# Patient Record
Sex: MALE | Race: BLACK OR AFRICAN AMERICAN | NOT HISPANIC OR LATINO | URBAN - METROPOLITAN AREA
[De-identification: names, ages, dates, MRNs, and addresses within clinical notes are randomized per-mention and may not be internally consistent; named-entity substitution may affect disease eponyms.]

---

## 2018-11-29 ENCOUNTER — EMERGENCY (EMERGENCY)
Facility: HOSPITAL | Age: 28
LOS: 0 days | Discharge: HOME | End: 2018-11-30
Attending: EMERGENCY MEDICINE
Payer: MEDICAID

## 2018-11-29 VITALS
WEIGHT: 190.04 LBS | SYSTOLIC BLOOD PRESSURE: 129 MMHG | RESPIRATION RATE: 17 BRPM | TEMPERATURE: 98 F | HEART RATE: 81 BPM | HEIGHT: 70 IN | DIASTOLIC BLOOD PRESSURE: 72 MMHG | OXYGEN SATURATION: 98 %

## 2018-11-29 PROCEDURE — 93010 ELECTROCARDIOGRAM REPORT: CPT

## 2018-11-29 PROCEDURE — 99284 EMERGENCY DEPT VISIT MOD MDM: CPT

## 2018-11-29 NOTE — ED ADULT NURSE NOTE - OBJECTIVE STATEMENT
Pt states, "I was having some chest pain. I think its anxiety.  I found out my children aren't mine." Pt states, "I was having some chest pain. I think its anxiety.  I found out my children aren't mine."  Pt c/o chest pain since Wednesday.

## 2018-11-29 NOTE — ED ADULT NURSE NOTE - NSIMPLEMENTINTERV_GEN_ALL_ED
Implemented All Universal Safety Interventions:  [place] to call system. Call bell, personal items and telephone within reach. Instruct patient to call for assistance. Room bathroom lighting operational. Non-slip footwear when patient is off stretcher. Physically safe environment: no spills, clutter or unnecessary equipment. Stretcher in lowest position, wheels locked, appropriate side rails in place.

## 2018-11-29 NOTE — ED ADULT NURSE NOTE - NS ED NURSE RECORD ANOTHER VITAL SIGN
Patient Outreach Department    Patient was seen recently at an [facility].  An attempt was made to contact the patient's Mother to see how he is, verify his PCP, or if he would be interested in establishing care with an [facility] primary care provider.    Spoke to Pt's Mother and stated pt was doing really good, still a little cough and taking inhaler. PCP retired and currently looking. Provided web site information.  
Yes

## 2018-11-30 VITALS
OXYGEN SATURATION: 98 % | HEART RATE: 53 BPM | TEMPERATURE: 99 F | SYSTOLIC BLOOD PRESSURE: 118 MMHG | RESPIRATION RATE: 17 BRPM | DIASTOLIC BLOOD PRESSURE: 70 MMHG

## 2018-11-30 DIAGNOSIS — R07.89 OTHER CHEST PAIN: ICD-10-CM

## 2018-11-30 DIAGNOSIS — Z91.013 ALLERGY TO SEAFOOD: ICD-10-CM

## 2018-11-30 DIAGNOSIS — Z79.52 LONG TERM (CURRENT) USE OF SYSTEMIC STEROIDS: ICD-10-CM

## 2018-11-30 LAB
ALBUMIN SERPL ELPH-MCNC: 3.5 G/DL — SIGNIFICANT CHANGE UP (ref 3.3–5)
ALP SERPL-CCNC: 84 U/L — SIGNIFICANT CHANGE UP (ref 40–120)
ALT FLD-CCNC: 59 U/L — SIGNIFICANT CHANGE UP (ref 12–78)
ANION GAP SERPL CALC-SCNC: 10 MMOL/L — SIGNIFICANT CHANGE UP (ref 5–17)
APTT BLD: 25.3 SEC — LOW (ref 27.5–36.3)
AST SERPL-CCNC: 45 U/L — HIGH (ref 15–37)
BILIRUB SERPL-MCNC: 0.5 MG/DL — SIGNIFICANT CHANGE UP (ref 0.2–1.2)
BUN SERPL-MCNC: 16 MG/DL — SIGNIFICANT CHANGE UP (ref 7–23)
CALCIUM SERPL-MCNC: 8.2 MG/DL — LOW (ref 8.5–10.1)
CHLORIDE SERPL-SCNC: 107 MMOL/L — SIGNIFICANT CHANGE UP (ref 96–108)
CK MB BLD-MCNC: 0.4 % — SIGNIFICANT CHANGE UP (ref 0–3.5)
CK MB CFR SERPL CALC: 1.8 NG/ML — SIGNIFICANT CHANGE UP (ref 0.5–3.6)
CK SERPL-CCNC: 410 U/L — HIGH (ref 26–308)
CO2 SERPL-SCNC: 25 MMOL/L — SIGNIFICANT CHANGE UP (ref 22–31)
CREAT SERPL-MCNC: 1.23 MG/DL — SIGNIFICANT CHANGE UP (ref 0.5–1.3)
D DIMER BLD IA.RAPID-MCNC: 311 NG/ML DDU — HIGH
GLUCOSE SERPL-MCNC: 86 MG/DL — SIGNIFICANT CHANGE UP (ref 70–99)
HCT VFR BLD CALC: 39.2 % — SIGNIFICANT CHANGE UP (ref 39–50)
HGB BLD-MCNC: 13.1 G/DL — SIGNIFICANT CHANGE UP (ref 13–17)
INR BLD: 0.94 RATIO — SIGNIFICANT CHANGE UP (ref 0.88–1.16)
MCHC RBC-ENTMCNC: 28.5 PG — SIGNIFICANT CHANGE UP (ref 27–34)
MCHC RBC-ENTMCNC: 33.4 GM/DL — SIGNIFICANT CHANGE UP (ref 32–36)
MCV RBC AUTO: 85.4 FL — SIGNIFICANT CHANGE UP (ref 80–100)
NRBC # BLD: 0 /100 WBCS — SIGNIFICANT CHANGE UP (ref 0–0)
PLATELET # BLD AUTO: 290 K/UL — SIGNIFICANT CHANGE UP (ref 150–400)
POTASSIUM SERPL-MCNC: 3.7 MMOL/L — SIGNIFICANT CHANGE UP (ref 3.5–5.3)
POTASSIUM SERPL-SCNC: 3.7 MMOL/L — SIGNIFICANT CHANGE UP (ref 3.5–5.3)
PROT SERPL-MCNC: 7.2 GM/DL — SIGNIFICANT CHANGE UP (ref 6–8.3)
PROTHROM AB SERPL-ACNC: 10.5 SEC — SIGNIFICANT CHANGE UP (ref 10–12.9)
RBC # BLD: 4.59 M/UL — SIGNIFICANT CHANGE UP (ref 4.2–5.8)
RBC # FLD: 15.2 % — HIGH (ref 10.3–14.5)
SODIUM SERPL-SCNC: 142 MMOL/L — SIGNIFICANT CHANGE UP (ref 135–145)
TROPONIN I SERPL-MCNC: <.015 NG/ML — SIGNIFICANT CHANGE UP (ref 0.01–0.04)
TROPONIN I SERPL-MCNC: <.015 NG/ML — SIGNIFICANT CHANGE UP (ref 0.01–0.04)
WBC # BLD: 4.75 K/UL — SIGNIFICANT CHANGE UP (ref 3.8–10.5)
WBC # FLD AUTO: 4.75 K/UL — SIGNIFICANT CHANGE UP (ref 3.8–10.5)

## 2018-11-30 PROCEDURE — 71275 CT ANGIOGRAPHY CHEST: CPT | Mod: 26

## 2018-11-30 PROCEDURE — 71046 X-RAY EXAM CHEST 2 VIEWS: CPT | Mod: 26

## 2018-11-30 RX ORDER — ASPIRIN/CALCIUM CARB/MAGNESIUM 324 MG
325 TABLET ORAL ONCE
Qty: 0 | Refills: 0 | Status: COMPLETED | OUTPATIENT
Start: 2018-11-30 | End: 2018-11-30

## 2018-11-30 RX ADMIN — Medication 325 MILLIGRAM(S): at 05:31

## 2018-11-30 NOTE — ED PROVIDER NOTE - MEDICAL DECISION MAKING DETAILS
labs and imaging reviewed. patient received aspirin. he has been in good condition throughout ed course. patient now being discharged with care instructions. he will follow up with pmd. Discussed results and outcome of testing with the patient.  Patient advised to please follow up with their primary care doctor within the next 24 hours and return to the Emergency Department for worsening symptoms or any other concerns.  Patient advised that their doctor may call  to follow up on the specific results of the tests performed today in the emergency department.

## 2018-11-30 NOTE — ED PROVIDER NOTE - CARE PROVIDER_API CALL
Serafin Faith), Internal Medicine  [address] [place] [address]  Phone: [phone]  Fax: [phone]

## 2018-11-30 NOTE — ED PROVIDER NOTE - OBJECTIVE STATEMENT
Pertinent PMH/PSH/FHx/SHx and Review of Systems contained within:  27 yo m with no pmh presents in ED c/o chest pain for the last 3 days. No aggravating or relieving factors, No fever/chills, No photophobia/eye pain/changes in vision, No ear pain/sore throat/dysphagia, No palpitations, no SOB/cough/wheeze/stridor, No abdominal pain, No N/V/D, no dysuria/frequency/discharge, No neck/back pain, no rash, no changes in neurological status/function.

## 2018-12-01 ENCOUNTER — OUTPATIENT (OUTPATIENT)
Dept: OUTPATIENT SERVICES | Facility: HOSPITAL | Age: 28
LOS: 1 days | End: 2018-12-01
Payer: MEDICAID

## 2018-12-01 PROCEDURE — G9001: CPT

## 2018-12-05 DIAGNOSIS — Z71.89 OTHER SPECIFIED COUNSELING: ICD-10-CM

## 2019-08-07 ENCOUNTER — EMERGENCY (EMERGENCY)
Facility: HOSPITAL | Age: 29
LOS: 0 days | Discharge: HOME | End: 2019-08-07
Payer: COMMERCIAL

## 2019-08-07 VITALS
HEART RATE: 76 BPM | SYSTOLIC BLOOD PRESSURE: 136 MMHG | TEMPERATURE: 98 F | OXYGEN SATURATION: 98 % | DIASTOLIC BLOOD PRESSURE: 78 MMHG | RESPIRATION RATE: 17 BRPM

## 2019-08-07 VITALS
HEART RATE: 63 BPM | OXYGEN SATURATION: 100 % | DIASTOLIC BLOOD PRESSURE: 85 MMHG | HEIGHT: 71 IN | RESPIRATION RATE: 20 BRPM | TEMPERATURE: 98 F | WEIGHT: 188.94 LBS | SYSTOLIC BLOOD PRESSURE: 139 MMHG

## 2019-08-07 DIAGNOSIS — M54.2 CERVICALGIA: ICD-10-CM
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
## 2019-08-07 DIAGNOSIS — Z91.013 ALLERGY TO SEAFOOD: ICD-10-CM

## 2019-08-07 DIAGNOSIS — T14.90XA INJURY, UNSPECIFIED, INITIAL ENCOUNTER: ICD-10-CM

## 2019-08-07 DIAGNOSIS — Z88.8 ALLERGY STATUS TO OTHER DRUGS, MEDICAMENTS AND BIOLOGICAL SUBSTANCES STATUS: ICD-10-CM
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
## 2019-08-07 DIAGNOSIS — M54.5 LOW BACK PAIN: ICD-10-CM

## 2019-08-07 DIAGNOSIS — Z04.1 ENCOUNTER FOR EXAMINATION AND OBSERVATION FOLLOWING TRANSPORT ACCIDENT: ICD-10-CM

## 2019-08-07 PROCEDURE — 99283 EMERGENCY DEPT VISIT LOW MDM: CPT

## 2019-08-07 RX ORDER — IBUPROFEN 200 MG
1 TABLET ORAL
Qty: 20 | Refills: 0
Start: 2019-08-07

## 2019-08-07 RX ORDER — CYCLOBENZAPRINE HYDROCHLORIDE 10 MG/1
1 TABLET, FILM COATED ORAL
Qty: 10 | Refills: 0
Start: 2019-08-07

## 2019-08-07 RX ORDER — IBUPROFEN 200 MG
600 TABLET ORAL ONCE
Refills: 0 | Status: COMPLETED | OUTPATIENT
Start: 2019-08-07 | End: 2019-08-07

## 2019-08-07 RX ADMIN — Medication 600 MILLIGRAM(S): at 16:40

## 2019-08-07 RX ADMIN — Medication 600 MILLIGRAM(S): at 16:41
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
## 2019-08-07 NOTE — ED PROVIDER NOTE - PHYSICAL EXAMINATION
Gen: Alert, NAD, well appearing  Head: NC, AT, PERRL, EOMI, normal lids/conjunctiva  ENT: B TM WNL, normal hearing, patent oropharynx without erythema/exudate, uvula midline  Neck: +supple, no spinal tenderness/meningismus/JVD, +Trachea midline +R trapezius tenderness  Pulm: Bilateral BS, normal resp effort, no wheeze/stridor/retractions  CV: RRR, no M/R/G, +dist pulses  Abd: soft, NT/ND, +BS, no hepatosplenomegaly  Mskel: no edema/erythema/cyanosis no saddle anesthesia, +R lumbar paraspinal tenderness, no step off, no ecchymosis, no spinal tenderness ctls, neg slr, str 5/5 x4 bl, sensations intact, gait ok  Skin: no rash  Neuro: AAOx3, no sensory/motor deficits, CN 2-12 intact

## 2019-08-07 NOTE — ED PROVIDER NOTE - CLINICAL SUMMARY MEDICAL DECISION MAKING FREE TEXT BOX
nexus neg, no red flags, neuro exam unremarkable, pt is ambulatory in ed without compliactions, vss, afebrile, pt states feels better after meds pt driving home will send muscle relaxer to pharmacy, pt has pcp he will fu with, provided ortho spine fu, return precautions given, ok with dc
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
## 2019-09-17 ENCOUNTER — EMERGENCY (EMERGENCY)
Facility: HOSPITAL | Age: 29
LOS: 1 days | Discharge: HOME | End: 2019-09-17
Attending: STUDENT IN AN ORGANIZED HEALTH CARE EDUCATION/TRAINING PROGRAM
Payer: COMMERCIAL

## 2019-09-17 VITALS
HEIGHT: 70 IN | DIASTOLIC BLOOD PRESSURE: 94 MMHG | SYSTOLIC BLOOD PRESSURE: 150 MMHG | TEMPERATURE: 99 F | OXYGEN SATURATION: 95 % | WEIGHT: 190.04 LBS | HEART RATE: 96 BPM | RESPIRATION RATE: 22 BRPM

## 2019-09-17 LAB
ALBUMIN SERPL ELPH-MCNC: 4.8 G/DL — SIGNIFICANT CHANGE UP (ref 3.3–5.2)
ALP SERPL-CCNC: 94 U/L — SIGNIFICANT CHANGE UP (ref 40–120)
ALT FLD-CCNC: 38 U/L — SIGNIFICANT CHANGE UP
ANION GAP SERPL CALC-SCNC: 14 MMOL/L — SIGNIFICANT CHANGE UP (ref 5–17)
AST SERPL-CCNC: 40 U/L — HIGH
BASOPHILS # BLD AUTO: 0.05 K/UL — SIGNIFICANT CHANGE UP (ref 0–0.2)
BASOPHILS NFR BLD AUTO: 0.4 % — SIGNIFICANT CHANGE UP (ref 0–2)
BILIRUB SERPL-MCNC: 0.6 MG/DL — SIGNIFICANT CHANGE UP (ref 0.4–2)
BUN SERPL-MCNC: 15 MG/DL — SIGNIFICANT CHANGE UP (ref 8–20)
CALCIUM SERPL-MCNC: 9.7 MG/DL — SIGNIFICANT CHANGE UP (ref 8.6–10.2)
CHLORIDE SERPL-SCNC: 100 MMOL/L — SIGNIFICANT CHANGE UP (ref 98–107)
CO2 SERPL-SCNC: 22 MMOL/L — SIGNIFICANT CHANGE UP (ref 22–29)
CREAT SERPL-MCNC: 1.13 MG/DL — SIGNIFICANT CHANGE UP (ref 0.5–1.3)
D DIMER BLD IA.RAPID-MCNC: 234 NG/ML DDU — HIGH
EOSINOPHIL # BLD AUTO: 0.03 K/UL — SIGNIFICANT CHANGE UP (ref 0–0.5)
EOSINOPHIL NFR BLD AUTO: 0.2 % — SIGNIFICANT CHANGE UP (ref 0–6)
GLUCOSE SERPL-MCNC: 135 MG/DL — HIGH (ref 70–115)
HCT VFR BLD CALC: 42 % — SIGNIFICANT CHANGE UP (ref 39–50)
HGB BLD-MCNC: 14 G/DL — SIGNIFICANT CHANGE UP (ref 13–17)
IMM GRANULOCYTES NFR BLD AUTO: 0.6 % — SIGNIFICANT CHANGE UP (ref 0–1.5)
LYMPHOCYTES # BLD AUTO: 0.8 K/UL — LOW (ref 1–3.3)
LYMPHOCYTES # BLD AUTO: 6.2 % — LOW (ref 13–44)
MCHC RBC-ENTMCNC: 27.9 PG — SIGNIFICANT CHANGE UP (ref 27–34)
MCHC RBC-ENTMCNC: 33.3 GM/DL — SIGNIFICANT CHANGE UP (ref 32–36)
MCV RBC AUTO: 83.7 FL — SIGNIFICANT CHANGE UP (ref 80–100)
MONOCYTES # BLD AUTO: 0.43 K/UL — SIGNIFICANT CHANGE UP (ref 0–0.9)
MONOCYTES NFR BLD AUTO: 3.3 % — SIGNIFICANT CHANGE UP (ref 2–14)
NEUTROPHILS # BLD AUTO: 11.45 K/UL — HIGH (ref 1.8–7.4)
NEUTROPHILS NFR BLD AUTO: 89.3 % — HIGH (ref 43–77)
PLATELET # BLD AUTO: 301 K/UL — SIGNIFICANT CHANGE UP (ref 150–400)
POTASSIUM SERPL-MCNC: 4.3 MMOL/L — SIGNIFICANT CHANGE UP (ref 3.5–5.3)
POTASSIUM SERPL-SCNC: 4.3 MMOL/L — SIGNIFICANT CHANGE UP (ref 3.5–5.3)
PROT SERPL-MCNC: 8 G/DL — SIGNIFICANT CHANGE UP (ref 6.6–8.7)
RAPID RVP RESULT: DETECTED
RBC # BLD: 5.02 M/UL — SIGNIFICANT CHANGE UP (ref 4.2–5.8)
RBC # FLD: 15.2 % — HIGH (ref 10.3–14.5)
RV+EV RNA SPEC QL NAA+PROBE: DETECTED
SODIUM SERPL-SCNC: 136 MMOL/L — SIGNIFICANT CHANGE UP (ref 135–145)
WBC # BLD: 12.84 K/UL — HIGH (ref 3.8–10.5)
WBC # FLD AUTO: 12.84 K/UL — HIGH (ref 3.8–10.5)

## 2019-09-17 PROCEDURE — 99218: CPT

## 2019-09-17 PROCEDURE — 71275 CT ANGIOGRAPHY CHEST: CPT | Mod: 26

## 2019-09-17 PROCEDURE — 93010 ELECTROCARDIOGRAM REPORT: CPT | Mod: 76

## 2019-09-17 RX ORDER — CEFTRIAXONE 500 MG/1
1000 INJECTION, POWDER, FOR SOLUTION INTRAMUSCULAR; INTRAVENOUS ONCE
Refills: 0 | Status: COMPLETED | OUTPATIENT
Start: 2019-09-17 | End: 2019-09-17

## 2019-09-17 RX ORDER — AZITHROMYCIN 500 MG/1
500 TABLET, FILM COATED ORAL ONCE
Refills: 0 | Status: COMPLETED | OUTPATIENT
Start: 2019-09-17 | End: 2019-09-17

## 2019-09-17 RX ORDER — IPRATROPIUM/ALBUTEROL SULFATE 18-103MCG
3 AEROSOL WITH ADAPTER (GRAM) INHALATION ONCE
Refills: 0 | Status: COMPLETED | OUTPATIENT
Start: 2019-09-17 | End: 2019-09-17

## 2019-09-17 RX ORDER — IPRATROPIUM/ALBUTEROL SULFATE 18-103MCG
3 AEROSOL WITH ADAPTER (GRAM) INHALATION EVERY 4 HOURS
Refills: 0 | Status: DISCONTINUED | OUTPATIENT
Start: 2019-09-17 | End: 2019-09-18

## 2019-09-17 RX ORDER — SODIUM CHLORIDE 9 MG/ML
1000 INJECTION, SOLUTION INTRAVENOUS ONCE
Refills: 0 | Status: COMPLETED | OUTPATIENT
Start: 2019-09-17 | End: 2019-09-17

## 2019-09-17 RX ORDER — ALBUTEROL 90 UG/1
2.5 AEROSOL, METERED ORAL EVERY 6 HOURS
Refills: 0 | Status: DISCONTINUED | OUTPATIENT
Start: 2019-09-17 | End: 2019-09-23

## 2019-09-17 RX ADMIN — SODIUM CHLORIDE 2000 MILLILITER(S): 9 INJECTION, SOLUTION INTRAVENOUS at 20:00

## 2019-09-17 RX ADMIN — Medication 125 MILLIGRAM(S): at 18:45

## 2019-09-17 RX ADMIN — AZITHROMYCIN 500 MILLIGRAM(S): 500 TABLET, FILM COATED ORAL at 22:45

## 2019-09-17 RX ADMIN — CEFTRIAXONE 100 MILLIGRAM(S): 500 INJECTION, POWDER, FOR SOLUTION INTRAMUSCULAR; INTRAVENOUS at 22:45

## 2019-09-17 RX ADMIN — Medication 3 MILLILITER(S): at 18:35

## 2019-09-17 RX ADMIN — Medication 3 MILLILITER(S): at 18:34

## 2019-09-17 RX ADMIN — Medication 3 MILLILITER(S): at 15:10

## 2019-09-17 NOTE — ED PROVIDER NOTE - ATTENDING CONTRIBUTION TO CARE
I have personally performed the HPI, physical exam and medical decision making and was personally present and verified all student documentation or findings including history, physical exam, and medical decision making except as noted.

## 2019-09-17 NOTE — ED CDU PROVIDER INITIAL DAY NOTE - OBJECTIVE STATEMENT
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 He has had congestion, rhinorrhea, and productive cough for 4 days with occasional chills/body aches. He has had intermittent SOB with wheezing and chest tightness for the last 3 days with temporary relief from albuterol. His symptoms are worse at night with cold air. Today he went to urgent care where he was febrile to 100.4, hypoxic, and tachycardic to 130s. He was given duonebs, ibuprofen, and steroids with improvement. He denies chest pain, palpitations, nausea, vomiting, sick contacts.

## 2019-09-17 NOTE — ED PROVIDER NOTE - PROGRESS NOTE DETAILS
no PE still wheezing; + airspace patchy opacity to upper lobe will tx with abx--obs for abx nebs steroids; FLUIDS AS tachy most likely sp nebs

## 2019-09-17 NOTE — ED ADULT NURSE REASSESSMENT NOTE - NS ED NURSE REASSESS COMMENT FT1
pt sent back from CT with  out CT being preformed secondary to pt needed new IV... resp even and labored with bilat wheezing, .. placed new IV and medicated as ordered, will continue to monitor

## 2019-09-17 NOTE — ED PROVIDER NOTE - CARDIAC, MLM
Normal rate, regular rhythm.  Heart sounds S1, S2.  No murmurs, rubs or gallops. +tachycardia. regular rhythm.  Heart sounds S1, S2.  No murmurs, rubs or gallops.

## 2019-09-17 NOTE — ED ADULT NURSE NOTE - OBJECTIVE STATEMENT
pt A&Ox4, sent from Urgent care "they said I had PNA in both lungs" pt c/o sore throat, cough, fever/chills and increased difficulty breathing and chest tightness that started this weekend, pt was given motrin and x2 nebs treatments in urgent care pt report improvement with breathing after txs, resp even and unlabored no distress bilat minimal expiatory wheezing, abd soft NTND, denies dizziness, abd pain n/v/d

## 2019-09-17 NOTE — ED CDU PROVIDER INITIAL DAY NOTE - ATTENDING CONTRIBUTION TO CARE
I agree with the PA's note and was available for any issues/concerns. I was directly involved in patient care. 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 He has had congestion, rhinorrhea, and productive cough for 4 days with occasional chills/body aches  EXAM:  Lungs:    -nebs, pulse ox, abx, re-eval in AM

## 2019-09-17 NOTE — ED ADULT NURSE REASSESSMENT NOTE - NS ED NURSE REASSESS COMMENT FT1
MD Sebastian made aware of Hr.. .repeat EKG done and LR bolus started, pt placed on cardiac monitor, pt A&Ox4, pt reports feeling palpitation and chest tightness, will continue to monitor

## 2019-09-17 NOTE — ED CDU PROVIDER INITIAL DAY NOTE - DETAILS
PT presents to the ED with SOB found to have PNA on CT, diffuse wheezing and persistent tachycardia will tx wheeze, start ABX and re-eval for DC home.

## 2019-09-17 NOTE — ED PROVIDER NOTE - CLINICAL SUMMARY MEDICAL DECISION MAKING FREE TEXT BOX
Pt is 28 y/o M with PMH of ALL in remission who p/w 4 days of URI symptoms and SOB. Pt tachycardic and afebrile, sating well on RA. PE significant for wheezing w/o crackles/rales. CXR with no evidence of pneumonia. Will check CBC/CMP/d-dimer/RVP. Pt is 30 y/o M with PMH of ALL in remission who p/w 4 days of URI symptoms and SOB. Pt afebrile, sating well on RA. Exam significant for tachycardia and wheezing w/o crackles/rales. CXR with no evidence of pneumonia. Will check CBC/CMP/d-dimer/RVP. Wells score of 1.5 with d-dimer <500, effectively ruling out PE. Pt is 28 y/o M with PMH of ALL in remission who p/w 4 days of URI symptoms and SOB. Pt afebrile, sating well on RA. Exam significant for tachycardia and wheezing w/o crackles/rales. CXR with no evidence of pneumonia. Will check CBC/CMP/d-dimer/RVP. D-dimer positive, CTA chest to rule out PE. Pt is 30 y/o M with PMH of ALL in remission who p/w 4 days of URI symptoms and SOB. Pt afebrile, sating well on RA. Exam significant for tachycardia and wheezing w/o crackles/rales. CXR with no evidence of pneumonia. Will check CBC/CMP/d-dimer/RVP. D-dimer positive, CTA chest to rule out PE. - clinically appears like URI/viral pna?

## 2019-09-17 NOTE — ED ADULT NURSE REASSESSMENT NOTE - NS ED NURSE REASSESS COMMENT FT1
assumed care of pt @ 2300, report received from Jesica Ochoa RN, charting as noted. pt AOx4 c/o SOB, RR elevated, wheezing. pt placed on obs for nebulizer tmts and solumedrol. HR is sinus tach ~120bpm, PA Yosiworn made aware. Per PA, RN should alert PA when HR > 140bpm. Lung sounds wheezing bilaterally, RR 22 at rest while lying in bed, O2 sat 93% on room air. abd is soft and nontender with positive bowel sounds in all four quadrants, skin is warm, dry and appropriate for age and race. pt educated on plan of care and observation stay. Plan of care taught back to RN. Proficiency determined from successful pt teach back. Pt oriented to unit, staff, and room. Pt reeducated on call bell use. Bed locked in lowest position, call bell within reach. All questions and concerns addressed.

## 2019-09-17 NOTE — ED PROVIDER NOTE - OBJECTIVE STATEMENT
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 He has had congestion, rhinorrhea, and productive cough for 4 days with occasional chills/body aches. He has had intermittent SOB with wheezing and chest tightness for the last 3 days with temporary relief from albuterol. His symptoms are worse at night with cold air. Today he went to urgent care where he was febrile to 100.4, hypoxic, and tachycardic to 130s. He was given duonebs, ibuprofen, and steroids with improvement. He denies chest pain, palpitations, nausea, vomiting, sick contacts. 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 He has had congestion, rhinorrhea, and productive cough for 4 days with occasional chills/body aches. He has had intermittent SOB with wheezing and chest tightness for the last 3 days with temporary relief from albuterol. His symptoms are worse at night with cold air. Today he went to urgent care where he was febrile to 100.4, hypoxic, and tachycardic to 130s. He was given duonebs, ibuprofen, and steroids with improvement. He denies chest pain, palpitations, nausea, vomiting, sick contacts. Denies /rash/headache/dizziness/abd.pain/d/c/dysuria/hematuria. NO HX OF DVT/PE  no recent travel

## 2019-09-17 NOTE — ED ADULT TRIAGE NOTE - CHIEF COMPLAINT QUOTE
Pt with h/o leukemia that was treated with chemo/bone marrow transplant and radiation went to Urgent Care c/o SOB x's 4 days. Pt was given combi tx x's 1 and sent for evaluation. Pt reports improvement in breathing, still appears slight SOB and tachycardic.

## 2019-09-17 NOTE — ED ADULT NURSE NOTE - NSIMPLEMENTINTERV_GEN_ALL_ED
Implemented All Universal Safety Interventions:  [place] to call system. Call bell, personal items and telephone within reach. Instruct patient to call for assistance. Room bathroom lighting operational. Non-slip footwear when patient is off stretcher. Physically safe environment: no spills, clutter or unnecessary equipment. Stretcher in lowest position, wheels locked, appropriate side rails in place.

## 2019-09-17 NOTE — ED CDU PROVIDER INITIAL DAY NOTE - MEDICAL DECISION MAKING DETAILS
PT with PNA and SOB placed in obs for Therapeutic intensity. PT seen BY OBS PA found to be appropriate CDU PT care transferred to PA.

## 2019-09-18 VITALS
TEMPERATURE: 98 F | HEART RATE: 95 BPM | DIASTOLIC BLOOD PRESSURE: 77 MMHG | SYSTOLIC BLOOD PRESSURE: 134 MMHG | RESPIRATION RATE: 18 BRPM | OXYGEN SATURATION: 97 %

## 2019-09-18 PROCEDURE — 85379 FIBRIN DEGRADATION QUANT: CPT

## 2019-09-18 PROCEDURE — 87486 CHLMYD PNEUM DNA AMP PROBE: CPT

## 2019-09-18 PROCEDURE — 36415 COLL VENOUS BLD VENIPUNCTURE: CPT

## 2019-09-18 PROCEDURE — 85027 COMPLETE CBC AUTOMATED: CPT

## 2019-09-18 PROCEDURE — 99285 EMERGENCY DEPT VISIT HI MDM: CPT | Mod: 25

## 2019-09-18 PROCEDURE — 87633 RESP VIRUS 12-25 TARGETS: CPT

## 2019-09-18 PROCEDURE — 96374 THER/PROPH/DIAG INJ IV PUSH: CPT | Mod: XU

## 2019-09-18 PROCEDURE — 80053 COMPREHEN METABOLIC PANEL: CPT

## 2019-09-18 PROCEDURE — 93005 ELECTROCARDIOGRAM TRACING: CPT

## 2019-09-18 PROCEDURE — 99217: CPT

## 2019-09-18 PROCEDURE — 87581 M.PNEUMON DNA AMP PROBE: CPT

## 2019-09-18 PROCEDURE — 94640 AIRWAY INHALATION TREATMENT: CPT

## 2019-09-18 PROCEDURE — G0378: CPT

## 2019-09-18 PROCEDURE — 96375 TX/PRO/DX INJ NEW DRUG ADDON: CPT | Mod: XU

## 2019-09-18 PROCEDURE — 71275 CT ANGIOGRAPHY CHEST: CPT

## 2019-09-18 PROCEDURE — 87798 DETECT AGENT NOS DNA AMP: CPT

## 2019-09-18 RX ORDER — ALBUTEROL 90 UG/1
2 AEROSOL, METERED ORAL
Qty: 1 | Refills: 0
Start: 2019-09-18 | End: 2019-09-24

## 2019-09-18 RX ORDER — AZITHROMYCIN 500 MG/1
1 TABLET, FILM COATED ORAL
Qty: 1 | Refills: 0
Start: 2019-09-18 | End: 2019-09-22

## 2019-09-18 RX ADMIN — Medication 125 MILLIGRAM(S): at 02:23

## 2019-09-18 RX ADMIN — ALBUTEROL 2.5 MILLIGRAM(S): 90 AEROSOL, METERED ORAL at 05:27

## 2019-09-18 RX ADMIN — ALBUTEROL 2.5 MILLIGRAM(S): 90 AEROSOL, METERED ORAL at 00:01

## 2019-09-18 NOTE — ED ADULT NURSE REASSESSMENT NOTE - NS ED NURSE REASSESS COMMENT FT1
pt educated on nebulizer treatment order. RN administered neb. after RN left room, pt turned nebulizer treatment off stating, "it makes my heart rate go up". pt educated on importance of compliance with nebulizer treatment and cardiac monitoring while receiving nebulizer treatments/steroids. pt educated on not altering medical equipment and encouraged pt ask for help as needed using call bell. stretcher locked in lowest position, call bell in reach.

## 2019-09-18 NOTE — ED CDU PROVIDER SUBSEQUENT DAY NOTE - PROGRESS NOTE DETAILS
PT with improved symptomology, improved VS, LUNGS clear no RESP distress, will likely dc home in the AM with ABX continued steroids, follow up to PCP, educated about when to return to the ED if needed. PT verbalizes that he understands all instructions and results.

## 2019-09-18 NOTE — ED CDU PROVIDER DISPOSITION NOTE - PATIENT PORTAL LINK FT
You can access the FollowMyHealth Patient Portal offered by [facility] by registering at the following website: [email or web address]. By joining [employer]’s FollowMyHealth portal, you will also be able to view your health information using other applications (apps) compatible with our system.

## 2019-09-18 NOTE — ED CDU PROVIDER DISPOSITION NOTE - CLINICAL COURSE
Pt is a 28 y/o M with PMH of ALL in remission for many years, who presents with SOB, wheezing and chest tightness for the last 3 days with temporary relief from albuterol. placed in OBS for persistent tachycardia and wheezing. PT with improved symptomology, improved VS, LUNGS clear no RESP distress, will likely dc home in the AM with ABX continued steroids, follow up to PCP, educated about when to return to the ED if needed. PT verbalizes that he understands all instructions and results.

## 2019-09-18 NOTE — ED ADULT NURSE REASSESSMENT NOTE - NS ED NURSE REASSESS COMMENT FT1
pt verbalizing relief from SOB and wheezing at this time.  discharge dispo received. pt d/c in stable condition, no apparent distress noted at this time. pt A&Ox3. pt able to ambulate with steady gait. pt in no distress at d/c.

## 2021-01-29 NOTE — ED ADULT NURSE NOTE - RESPIRATORY WDL
previous QTY was 7 no refill for glimepiride ?      
Breathing spontaneous and unlabored. Breath sounds clear and equal bilaterally with regular rhythm.

## 2022-05-04 NOTE — ED ADULT NURSE NOTE - CAS EDN DISCHARGE INTERVENTIONS
THANK YOU FROM YOUR CARE TEAM    Our staff would like to THANK YOU for choosing [facility]. Our goal is to always provide you with the best of care and we continue to look for better ways to improve the services we provide.     You may receive a survey in the mail with questions specific to your encounter with our clinic. Should you receive a survey, please take a few minutes to rate your experience.   Our providers and staff value your feedback.  Thank you in advance for your time and participation.     We appreciate the opportunity to partner with you to meet your health care needs. THANK YOU, again, for choosing us to be your care team.     Medical Assistant: Marlee  Provider: Eladio Ochoa MD  Care Coordinator:  MANUEL Barnes    [facility]  [address], [address]  [place] [address]  Phone:  [phone]  Fax:  [phone]    Dora Mi, Manager Clinic operations  [email or web address]     [phone]                                            ...        Marco Antonio Hare    DURING YOUR APPOINTMENT TODAY  Please review the following instructions carefully for the next steps in your care.       INJECTION    DAY OF PROCEDURE     Diet  • It is recommended that you do not eat or drink for 2 hours (except sips of water for taking routine medication) prior to the actual injection.  • If your injection is in the morning you may eat a very early light breakfast at least 2 hours prior to the injection.     Medications on the day of procedure  • Take your usual medications with a sip of water.   • Make sure you take your blood pressure and/or heart medicines.    OTHER POSSIBLE CONSIDERATIONS     Blood thinning medications     Plavix     Eladio Ochoa MD wants you to continue your Plavix as usual.  Do not hold.     IMPORTANT    ? In general, we recommend you have someone drive you home from the procedure  ? Arrive 30 minutes before your scheduled time    ? Plan for 2½ hours total time    You were given a copy of the [facility]'s provider teaching sheet called \"General Information about Joint Injections done with Video Fluoroscopy\" = yellow sheet    LOCATION:  Ambulatory Services/Pain Management Center  [facility] entrance  [address] [place] [address]  • Enter off [address], [facility] entrance next to the ER  • Follow the main hallway straight ahead to the glass elevator  • Ambulatory Services/Pain Management will be on the left  • Check in at the Pain Management Desk         Steroids and COVID Vaccine Information  This information may or not pertain to your visit today    If you need to take an oral steroid, schedule a steroid injection and are getting the COVID vaccine:    • It is unclear if there is any impact with steroid use and the effectiveness of the COVID vaccine  • Based on that, our suggestion is to space out the vaccine and the steroids    • Pfizer and Moderna Vaccine (2 doses):  Allow 2 weeks before and 1 week after the COVID vaccine before having any steroid    • Julian & Julian (1 dose):  Allow 2 weeks before and 2 weeks after the COVID vaccine before having any steroid          Patient Responsibility - Insurance Disclaimer  Insurance Disclaimer: A quote of benefits and/or authorization does not guarantee payment or verify eligibility. Payment of benefits are subject to all terms, conditions, limitations, and exclusions of the member's contract at time of service.   Insurance Liability for Payment: Your health insurance company will only pay for services that it determines to be “reasonable and necessary.” Every effort will be made by this office to have all services and procedures preauthorized by your health insurance company, when applicable. If your health insurance company determines that a service is not reasonable and necessary, or that a service is not covered under the plan, your insurer will  deny payment for that service. We suggest to all patients that they contact their insurance to confirm that these services are covered.    Billing Concerns:  For assistance with medical billing and any financial inquiries.  please reach out to our Patient Contact Center, [phone]   IV discontinued, cath removed intact

## 2025-06-09 NOTE — ED PROVIDER NOTE - NS_EDPROVIDERDISPOUSERTYPE_ED_A_ED
I have personally evaluated and examined the patient. The Attending was available to me as a supervising provider if needed.
Family

## 2025-07-07 ENCOUNTER — EMERGENCY (EMERGENCY)
Facility: HOSPITAL | Age: 35
LOS: 1 days | End: 2025-07-07
Attending: STUDENT IN AN ORGANIZED HEALTH CARE EDUCATION/TRAINING PROGRAM
Payer: COMMERCIAL

## 2025-07-07 VITALS
OXYGEN SATURATION: 93 % | WEIGHT: 201.72 LBS | DIASTOLIC BLOOD PRESSURE: 94 MMHG | TEMPERATURE: 98 F | SYSTOLIC BLOOD PRESSURE: 145 MMHG | RESPIRATION RATE: 16 BRPM | HEART RATE: 100 BPM

## 2025-07-07 VITALS
SYSTOLIC BLOOD PRESSURE: 147 MMHG | HEART RATE: 128 BPM | RESPIRATION RATE: 17 BRPM | OXYGEN SATURATION: 97 % | DIASTOLIC BLOOD PRESSURE: 100 MMHG

## 2025-07-07 PROBLEM — C91.01 ACUTE LYMPHOBLASTIC LEUKEMIA, IN REMISSION: Chronic | Status: ACTIVE | Noted: 2019-09-17

## 2025-07-07 LAB
ALBUMIN SERPL ELPH-MCNC: 4.6 G/DL — SIGNIFICANT CHANGE UP (ref 3.3–5.2)
ALP SERPL-CCNC: 95 U/L — SIGNIFICANT CHANGE UP (ref 40–120)
ALT FLD-CCNC: 33 U/L — SIGNIFICANT CHANGE UP
ANION GAP SERPL CALC-SCNC: 17 MMOL/L — SIGNIFICANT CHANGE UP (ref 5–17)
AST SERPL-CCNC: 32 U/L — SIGNIFICANT CHANGE UP
BASOPHILS # BLD AUTO: 0.05 K/UL — SIGNIFICANT CHANGE UP (ref 0–0.2)
BASOPHILS NFR BLD AUTO: 0.7 % — SIGNIFICANT CHANGE UP (ref 0–2)
BILIRUB SERPL-MCNC: 0.8 MG/DL — SIGNIFICANT CHANGE UP (ref 0.4–2)
BUN SERPL-MCNC: 10.5 MG/DL — SIGNIFICANT CHANGE UP (ref 8–20)
CALCIUM SERPL-MCNC: 9.3 MG/DL — SIGNIFICANT CHANGE UP (ref 8.4–10.5)
CHLORIDE SERPL-SCNC: 99 MMOL/L — SIGNIFICANT CHANGE UP (ref 96–108)
CO2 SERPL-SCNC: 22 MMOL/L — SIGNIFICANT CHANGE UP (ref 22–29)
CREAT SERPL-MCNC: 1 MG/DL — SIGNIFICANT CHANGE UP (ref 0.5–1.3)
EGFR: 99 ML/MIN/1.73M2 — SIGNIFICANT CHANGE UP
EGFR: 99 ML/MIN/1.73M2 — SIGNIFICANT CHANGE UP
EOSINOPHIL # BLD AUTO: 0.68 K/UL — HIGH (ref 0–0.5)
EOSINOPHIL NFR BLD AUTO: 10 % — HIGH (ref 0–6)
GLUCOSE SERPL-MCNC: 97 MG/DL — SIGNIFICANT CHANGE UP (ref 70–99)
HCT VFR BLD CALC: 42.7 % — SIGNIFICANT CHANGE UP (ref 39–50)
HGB BLD-MCNC: 14.5 G/DL — SIGNIFICANT CHANGE UP (ref 13–17)
IMM GRANULOCYTES # BLD AUTO: 0.01 K/UL — SIGNIFICANT CHANGE UP (ref 0–0.07)
IMM GRANULOCYTES NFR BLD AUTO: 0.1 % — SIGNIFICANT CHANGE UP (ref 0–0.9)
LYMPHOCYTES # BLD AUTO: 1.26 K/UL — SIGNIFICANT CHANGE UP (ref 1–3.3)
LYMPHOCYTES NFR BLD AUTO: 18.6 % — SIGNIFICANT CHANGE UP (ref 13–44)
MCHC RBC-ENTMCNC: 27.7 PG — SIGNIFICANT CHANGE UP (ref 27–34)
MCHC RBC-ENTMCNC: 34 G/DL — SIGNIFICANT CHANGE UP (ref 32–36)
MCV RBC AUTO: 81.5 FL — SIGNIFICANT CHANGE UP (ref 80–100)
MONOCYTES # BLD AUTO: 0.69 K/UL — SIGNIFICANT CHANGE UP (ref 0–0.9)
MONOCYTES NFR BLD AUTO: 10.2 % — SIGNIFICANT CHANGE UP (ref 2–14)
NEUTROPHILS # BLD AUTO: 4.09 K/UL — SIGNIFICANT CHANGE UP (ref 1.8–7.4)
NEUTROPHILS NFR BLD AUTO: 60.4 % — SIGNIFICANT CHANGE UP (ref 43–77)
NRBC # BLD AUTO: 0 K/UL — SIGNIFICANT CHANGE UP (ref 0–0)
NRBC # FLD: 0 K/UL — SIGNIFICANT CHANGE UP (ref 0–0)
NRBC BLD AUTO-RTO: 0 /100 WBCS — SIGNIFICANT CHANGE UP (ref 0–0)
PLATELET # BLD AUTO: 321 K/UL — SIGNIFICANT CHANGE UP (ref 150–400)
PMV BLD: 10.5 FL — SIGNIFICANT CHANGE UP (ref 7–13)
POTASSIUM SERPL-MCNC: 4.2 MMOL/L — SIGNIFICANT CHANGE UP (ref 3.5–5.3)
POTASSIUM SERPL-SCNC: 4.2 MMOL/L — SIGNIFICANT CHANGE UP (ref 3.5–5.3)
PROT SERPL-MCNC: 7.9 G/DL — SIGNIFICANT CHANGE UP (ref 6.6–8.7)
RAPID RVP RESULT: DETECTED
RBC # BLD: 5.24 M/UL — SIGNIFICANT CHANGE UP (ref 4.2–5.8)
RBC # FLD: 15 % — HIGH (ref 10.3–14.5)
RV+EV RNA SPEC QL NAA+PROBE: DETECTED
SARS-COV-2 RNA SPEC QL NAA+PROBE: SIGNIFICANT CHANGE UP
SODIUM SERPL-SCNC: 138 MMOL/L — SIGNIFICANT CHANGE UP (ref 135–145)
WBC # BLD: 6.78 K/UL — SIGNIFICANT CHANGE UP (ref 3.8–10.5)
WBC # FLD AUTO: 6.78 K/UL — SIGNIFICANT CHANGE UP (ref 3.8–10.5)

## 2025-07-07 PROCEDURE — 80053 COMPREHEN METABOLIC PANEL: CPT

## 2025-07-07 PROCEDURE — 96375 TX/PRO/DX INJ NEW DRUG ADDON: CPT

## 2025-07-07 PROCEDURE — 36415 COLL VENOUS BLD VENIPUNCTURE: CPT

## 2025-07-07 PROCEDURE — 96374 THER/PROPH/DIAG INJ IV PUSH: CPT

## 2025-07-07 PROCEDURE — 0225U NFCT DS DNA&RNA 21 SARSCOV2: CPT

## 2025-07-07 PROCEDURE — 71046 X-RAY EXAM CHEST 2 VIEWS: CPT | Mod: 26

## 2025-07-07 PROCEDURE — 99285 EMERGENCY DEPT VISIT HI MDM: CPT | Mod: 25

## 2025-07-07 PROCEDURE — 93010 ELECTROCARDIOGRAM REPORT: CPT

## 2025-07-07 PROCEDURE — 85025 COMPLETE CBC W/AUTO DIFF WBC: CPT

## 2025-07-07 PROCEDURE — 94640 AIRWAY INHALATION TREATMENT: CPT

## 2025-07-07 PROCEDURE — 71046 X-RAY EXAM CHEST 2 VIEWS: CPT

## 2025-07-07 PROCEDURE — 93005 ELECTROCARDIOGRAM TRACING: CPT

## 2025-07-07 RX ORDER — ALBUTEROL SULFATE 2.5 MG/3ML
2 VIAL, NEBULIZER (ML) INHALATION EVERY 6 HOURS
Refills: 0 | Status: DISCONTINUED | OUTPATIENT
Start: 2025-07-07 | End: 2025-07-14

## 2025-07-07 RX ORDER — PREDNISONE 20 MG/1
2 TABLET ORAL
Qty: 10 | Refills: 0
Start: 2025-07-07 | End: 2025-07-11

## 2025-07-07 RX ORDER — METHYLPREDNISOLONE ACETATE 80 MG/ML
125 INJECTION, SUSPENSION INTRA-ARTICULAR; INTRALESIONAL; INTRAMUSCULAR; SOFT TISSUE ONCE
Refills: 0 | Status: COMPLETED | OUTPATIENT
Start: 2025-07-07 | End: 2025-07-07

## 2025-07-07 RX ORDER — ALBUTEROL SULFATE 2.5 MG/3ML
3 VIAL, NEBULIZER (ML) INHALATION
Qty: 1 | Refills: 0
Start: 2025-07-07 | End: 2025-08-05

## 2025-07-07 RX ORDER — ALBUTEROL SULFATE 2.5 MG/3ML
2 VIAL, NEBULIZER (ML) INHALATION
Qty: 1 | Refills: 0
Start: 2025-07-07 | End: 2025-08-05

## 2025-07-07 RX ORDER — MAGNESIUM SULFATE 500 MG/ML
2 SYRINGE (ML) INJECTION ONCE
Refills: 0 | Status: COMPLETED | OUTPATIENT
Start: 2025-07-07 | End: 2025-07-07

## 2025-07-07 RX ORDER — IPRATROPIUM BROMIDE AND ALBUTEROL SULFATE .5; 2.5 MG/3ML; MG/3ML
3 SOLUTION RESPIRATORY (INHALATION)
Refills: 0 | Status: COMPLETED | OUTPATIENT
Start: 2025-07-07 | End: 2025-07-07

## 2025-07-07 RX ADMIN — IPRATROPIUM BROMIDE AND ALBUTEROL SULFATE 3 MILLILITER(S): .5; 2.5 SOLUTION RESPIRATORY (INHALATION) at 05:55

## 2025-07-07 RX ADMIN — METHYLPREDNISOLONE ACETATE 125 MILLIGRAM(S): 80 INJECTION, SUSPENSION INTRA-ARTICULAR; INTRALESIONAL; INTRAMUSCULAR; SOFT TISSUE at 06:07

## 2025-07-07 RX ADMIN — IPRATROPIUM BROMIDE AND ALBUTEROL SULFATE 3 MILLILITER(S): .5; 2.5 SOLUTION RESPIRATORY (INHALATION) at 06:09

## 2025-07-07 RX ADMIN — Medication 150 GRAM(S): at 06:09

## 2025-07-07 RX ADMIN — IPRATROPIUM BROMIDE AND ALBUTEROL SULFATE 3 MILLILITER(S): .5; 2.5 SOLUTION RESPIRATORY (INHALATION) at 06:27

## 2025-07-07 NOTE — ED PROVIDER NOTE - ADDITIONAL NOTES AND INSTRUCTIONS:
PT was evaluated At [facility] ED and was found to have a condition that warranted time of to rest and heal from WORK/SCHOOL.   Navin Irizarry PA-C

## 2025-07-07 NOTE — ED PROVIDER NOTE - PHYSICAL EXAMINATION
General: In NAD, non-toxic/well-appearing.  Skin: No rashes or lesions. Warm, dry, color normal for race.   Head: Normocephalic/atraumatic.   Eyes: Sclera anicteric, conjunctivae clear b/l. PERRLA, EOMI.   Neck: Supple, FROM. N  Cardio: Rate and rhythm regular. No audible murmur.  Resp: Speaking in full sentences. Diffuse expiratory wheezes b/l.   MSK: MAEx4. FROM.  Neuro: A&Ox3. Appears nonfocal.  Psych: Normal mood and affect.

## 2025-07-07 NOTE — ED PROVIDER NOTE - ATTENDING APP SHARED VISIT CONTRIBUTION OF CARE
34 yo male PMHx ALL (remission 20+ years) s/p chemo, radiation, and BM transplant presents to ED c/o shortness of breath x2 days. Reports cough, wheezing, subjective fevers. No relief with OTC inhaler. Of note, patient has never been formally diagnosed with asthma. Evaluated in ED in 2019 for similar, placed in OBS at that time. No other complaints at this time.   Denies recent illness, nasal congestion, chest pain.    ICarmelo, evaluated the patient with the PA, and completed the key components of the history and physical exam. I then discussed the management plan with the PA.

## 2025-07-07 NOTE — ED ADULT NURSE NOTE - OBJECTIVE STATEMENT
pt presents withc ough and SOB x saturday, tactile temps, h/o asthma. pt with mildly increased WOB, audible wheezing, denies albuterol use PTA. A/O x 4

## 2025-07-07 NOTE — ED PROVIDER NOTE - PATIENT PORTAL LINK FT
You can access the FollowMyHealth Patient Portal offered by [facility] by registering at the following website: [email or web address]. By joining [employer]’s FollowMyHealth portal, you will also be able to view your health information using other applications (apps) compatible with our system.

## 2025-07-07 NOTE — ED PROVIDER NOTE - NSFOLLOWUPINSTRUCTIONS_ED_ALL_ED_FT
Patient education: Wheezing in adults (The Basics)  Written by the doctors and editors at [facility]  Please read the Disclaimer at the end of this page.    What is wheezing?    This is when you hear a whistling sound when you breathe. It happens when your airways are swollen or blocked.    What causes wheezing?    The most common causes of wheezing are:    ?Asthma – This is a lung condition that can make it hard to breathe (figure 1). Asthma symptoms can vary a lot over time. "Triggers" are things that make asthma symptoms worse. Common triggers include smoke, air pollution, dust, mold, pollen, strong chemicals or smells, very cold or dry air, and exercise.    ?Chronic obstructive pulmonary disease, or "COPD" – This is a lung disease that gets worse with age, and is most often caused by smoking (figure 2). Chronic bronchitis and emphysema are types of COPD.    Many other things can also cause wheezing. They include:    ?An allergic reaction    ?Problems with the vocal cords or windpipe    ?Injury to the voice box    ?Mucus buildup in the airways    ?An enlarged thyroid gland    ?Infection    ?Something getting stuck in the airway    ?Narrow or crowded upper airways (especially if wheezing mostly happens when lying down)    Will I need tests?    Maybe. Your doctor or nurse will do an exam and ask about your symptoms. They might do tests, such as:    ?Breathing tests to check how air moves in and out of your lungs    ?A test to see if an inhaler medicine improves your breathing    ?Blood tests    ?Allergy skin tests    ?Chest X-ray    You might also get other tests. These can help your doctor figure out what is causing your wheezing.    How do I care for myself at home?    This depends on the cause of your wheezing. Some general tips:    ?If you have asthma or COPD, take all your medicines as instructed.    ?Avoid things that make your wheezing worse. It's especially important to avoid smoking and places where other people are smoking.    ?Use saline nose drops or spray to relieve stuffiness.    ?Use a cool mist humidifier. This might help if you have an upper respiratory infection, like a cold.    ?If you are coughing up mucus, try an over-the-counter cold and cough medicine. These can thin mucus and sometimes reduce the urge to cough.    ?If your wheezing was caused by an allergic reaction, avoid whatever you are allergic to. You might also get medicine to use if you have another reaction.    How is wheezing treated?    The first goal is to make sure you can breathe without trouble and get enough air and oxygen into your lungs. Some people need extra oxygen or help with their breathing.    If you have asthma or COPD, wheezing is treated with medicine to help open up the airways. This can be given using inhalers or a "nebulizer." A nebulizer is a machine that lets you breathe in the medicine as a mist.    Other treatments depend on the cause of the wheezing. For example, if something is blocking your airway, doctors might need to do a procedure to remove it. If infection or inflammation in your throat or neck is narrowing your airway, you might need urgent treatment. This could include antibiotics, allergy treatments, or surgery.    When should I call the doctor?    Call for an ambulance (in the [place] and [place], call 9-1-1) if:    ?You are having trouble breathing, even when resting.    ?Your lips or fingernails turn gray or blue.    ?You feel confused, dizzy, faint, or weak due to trouble breathing.    ?You can only say 1 or 2 words at a time.    ?You have hives or a widespread, itchy skin rash along with trouble breathing.    ?Your lips or tongue are swollen.    Call your doctor or nurse for advice if:    ?You have a fever of 100.4°F (38°C) or higher, or chills.    ?You feel weak or more short of breath than usual when doing your normal activities.    ?Your wheezing gets worse, or you have new symptoms.

## 2025-07-07 NOTE — ED ADULT NURSE REASSESSMENT NOTE - NS ED NURSE REASSESS COMMENT FT1
Assumed care of pt at 0715 from Rizwan GONZALEZ. Pt is sitting up in bed with HOB elevated as tolerated. Pt denies chest pain/sob. Respirations are even and unlabored. Pt is on cardiac monitor and Sinus tachy noted on screen. Pt Sp02 WNL. Pt is A&Ox3. Family at bedside.

## 2025-07-07 NOTE — ED PROVIDER NOTE - OBJECTIVE STATEMENT
34 yo male PMHx ALL (remission 20+ years) s/p chemo, radiation, and BM transplant presents to ED c/o shortness of breath x2 days. Reports cough, wheezing, subjective fevers. No relief with OTC inhaler. Of note, patient has never been formally diagnosed with asthma. Evaluated in ED in 2019 for similar, placed in OBS at that time. No other complaints at this time.   Denies recent illness, nasal congestion, chest pain.

## 2025-07-07 NOTE — ED PROVIDER NOTE - CLINICAL SUMMARY MEDICAL DECISION MAKING FREE TEXT BOX
34 yo male PMHx ALL (remission 20+ years) s/p chemo, radiation, and BM transplant presents to ED c/o shortness of breath x2 days. 34 yo male PMHx ALL (remission 20+ years) s/p chemo, radiation, and BM transplant presents to ED c/o shortness of breath x2 days. Similar presentation in 2019, no formal asthma diagnosis. On exam, patient diffusely wheezing. CXR negative. Patient signed out to AM team pending labs and reassessment. 34 yo male PMHx ALL (remission 20+ years) s/p chemo, radiation, and BM transplant presents to ED c/o shortness of breath x2 days. Similar presentation in 2019, no formal asthma diagnosis. On exam, patient diffusely wheezing. CXR negative. Patient signed out to AM team pending labs and reassessment.    Navin Irizarry PA-C: PT signed out to [facility] after lengthy discussion about case with [facility]. Pt with no acute findings on evaluation clinical improvement with meds in the ED, Pt cleared for dc home with meds to pharmacy, Pt educated about when to return to the ED if needed. PT verbalizes that he understands all instructions and results. Pt informed that ED is open and available 24/7 365 days a yr, encouraged to return to the ED if they have any change in condition, or feel the need for revaluation.

## 2025-07-08 NOTE — ED ADULT NURSE NOTE - PAIN: PRESENCE, MLM
Received medical records request from medical facility requesting medical records. All records located in [facility] in which request was sent to [facility]. Contact Dutch Oviedo, Ph.D. [facility] [phone]   complains of pain/discomfort